# Patient Record
Sex: MALE | Race: WHITE | Employment: UNEMPLOYED | URBAN - METROPOLITAN AREA
[De-identification: names, ages, dates, MRNs, and addresses within clinical notes are randomized per-mention and may not be internally consistent; named-entity substitution may affect disease eponyms.]

---

## 2019-03-15 ENCOUNTER — THERAPY VISIT (OUTPATIENT)
Dept: PHYSICAL THERAPY | Facility: CLINIC | Age: 79
End: 2019-03-15
Payer: COMMERCIAL

## 2019-03-15 DIAGNOSIS — R29.898 BILATERAL LEG WEAKNESS: Primary | ICD-10-CM

## 2019-03-15 PROCEDURE — 97530 THERAPEUTIC ACTIVITIES: CPT | Mod: GP | Performed by: PHYSICAL THERAPIST

## 2019-03-15 PROCEDURE — G8978 MOBILITY CURRENT STATUS: HCPCS | Mod: GP | Performed by: PHYSICAL THERAPIST

## 2019-03-15 PROCEDURE — 97161 PT EVAL LOW COMPLEX 20 MIN: CPT | Mod: GP | Performed by: PHYSICAL THERAPIST

## 2019-03-15 PROCEDURE — G8979 MOBILITY GOAL STATUS: HCPCS | Mod: GP | Performed by: PHYSICAL THERAPIST

## 2019-03-15 PROCEDURE — 97110 THERAPEUTIC EXERCISES: CPT | Mod: GP | Performed by: PHYSICAL THERAPIST

## 2019-03-15 NOTE — PROGRESS NOTES
[facility] Initial Evaluation  Subjective:                                     General health as reported by patient is good.  Pertinent medical history includes:  High blood pressure, osteoarthritis and other.  Medical allergies: no.  Other surgeries include:  Orthopedic surgery and other.  Current medications:  High blood pressure medication.  Current occupation is retired.                                    Objective:  System    Physical Exam    General     ROS    Assessment/Plan:

## 2019-03-15 NOTE — LETTER
"KYM ADAMS PT  [address]  [address]  [place] [address]  [phone]    2019    Re: Geronimo Jacobo   :   1940  MRN:  [id number]   REFERRING PHYSICIAN:   MD KYM Ram PT  Date of Initial Evaluation:  3/15/2019  Visits:  Rxs Used: 1  Reason for Referral:  Bilateral leg weakness    EVALUATION SUMMARY    [facility] Initial Evaluation  Subjective:  General   Condition requiring PT:  History of previous falls and weakness  Associated condition:  Fall  Onset date of current episode/exacerbation comment:  Patient reports the onset of lower extremity weakness starting in 2019 and patient is unable to explain why. Patient reports the leg weakness has resulted in a fall that occurred in 2019 at 5am. Patients spouse \"Paulina\" heard a \"boom\" in the house and found Gabirele on the floor between a wall and a chair. Patients spouse helped him transfer back into standing and they did not see an MD until 2 days later.   Site of Pain: no pain reported.  Quality: none.  Pain Scale: none.  Associated symptoms:  Loss of strength  Pain is:  Worse during the day  Symptoms exacerbated by:  Activity  Symptoms relieved by:  Rest  Progression since onset:  Unchanged  Special testing: none.  General health as reported by patient:  Good  Red flags:  None as reported by the patient    Objective:  Gait:  Knee extension decreased at heel strike  Gait Type:  Antalgic   Assistive Devices:  Walker  Deviations:  Lumbar:  Trunk flexionGeneral Deviations:  Stride length decr and stance time decr    Ankle/Foot Evaluation  ROM:  Arom ankle eval: WFL bilaterally.  Strength wnl ankle: grossly 4+/5 bilaterally.      Re: Geronimo Jacobo   :   1940       Hip Evaluation  Hip Strength:  : grossly 4-/5. : grossly 4-/5.       Knee Evaluation:  ROM:  Strength wnl knee: grossly 4+/5 bilaterally.  AROM  Hyperextension:  Left:  10 deg    Right: 2 deg  Flexion: Left: 120 deg  "   Right: 120 deg    General Evaluation:  Balance:    Single Leg Stance--Eyes Open:  Left: 0/30 sec    Right: 0/30 sec  Sit to Stand Test: 10/reps    Assessment/Plan:    Patient is a 78 year old male with weakness complaints.    Patient has the following significant findings with corresponding treatment plan.                Diagnosis 1:  Bilateral lower extremity weakness  Pain -  hot/cold therapy, self management, education and home program  Decreased strength - therapeutic exercise, therapeutic activities and home program  Impaired gait - gait training, assistive devices and home program  Impaired muscle performance - neuro re-education and home program  Decreased function - therapeutic activities and home program  Impaired posture - neuro re-education, therapeutic activities and home program    Therapy Evaluation Codes:   1) History comprised of:   Personal factors that impact the plan of care:      None.    Comorbidity factors that impact the plan of care are:      None.     Medications impacting care: None.  2) Examination of Body Systems comprised of:   Body structures and functions that impact the plan of care:      Ankle, Hip, Knee and Lumbar spine.   Activity limitations that impact the plan of care are:      Bathing, Bending, Dressing, Lifting, Squatting/kneeling, Stairs, Standing and Walking.  3) Clinical presentation characteristics are:   Stable/Uncomplicated.  4) Decision-Making    Low complexity using standardized patient assessment instrument and/or measureable assessment of functional outcome.  Cumulative Therapy Evaluation is: Low complexity.    Previous and current functional limitations:  (See Goal Flow Sheet for this information)    Short term and Long term goals: (See Goal Flow Sheet for this information)       Re: Geronimo Jacobo   :   1940    Communication ability:  Patient appears to be able to clearly communicate and understand verbal and written communication and follow directions  correctly.  Treatment Explanation - The following has been discussed with the patient:   RX ordered/plan of care  Anticipated outcomes  Possible risks and side effects  This patient would benefit from PT intervention to resume normal activities.   Rehab potential is good.    Frequency:  1 X week, once daily  Duration:  for 6 weeks  Discharge Plan:  Achieve all LTG.  Independent in home treatment program.  Reach maximal therapeutic benefit.    Please refer to the daily flowsheet for treatment today, total treatment time and time spent performing 1:1 timed codes.       Thank you for your referral.    INQUIRIES  Therapist: NELIDA Brennan PT  [address]  [address]  [place] [address]  Phone: [phone]  Fax: [phone]

## 2019-03-15 NOTE — PROGRESS NOTES
"[facility] Initial Evaluation  Subjective:    General   Condition requiring PT:  History of previous falls and weakness  Associated condition:  Fall  Onset date of current episode/exacerbation comment:  Patient reports the onset of lower extremity weakness starting in March 2019 and patient is unable to explain why. Patient reports the leg weakness has resulted in a fall that occurred in March 2019 at 5am. Patients spouse \"Paulina\" heard a \"boom\" in the house and found Gabriele on the floor between a wall and a chair. Patients spouse helped him transfer back into standing and they did not see an MD until 2 days later.   Site of Pain: no pain reported.  Quality: none.  Pain Scale: none.  Associated symptoms:  Loss of strength  Pain is:  Worse during the day  Symptoms exacerbated by:  Activity  Symptoms relieved by:  Rest  Progression since onset:  Unchanged  Special testing: none.  General health as reported by patient:  Good  Red flags:  None as reported by the patient                      Objective:    Gait:  Knee extension decreased at heel strike  Gait Type:  Antalgic   Assistive Devices:  Walker  Deviations:  Lumbar:  Trunk flexionGeneral Deviations:  Stride length decr and stance time decr          Ankle/Foot Evaluation  ROM:  Arom ankle eval: WFL bilaterally.      Strength wnl ankle: grossly 4+/5 bilaterally.                                                   Hip Evaluation    Hip Strength:  : grossly 4-/5. : grossly 4-/5.                                   Knee Evaluation:  ROM:  Strength wnl knee: grossly 4+/5 bilaterally.  AROM    Hyperextension:  Left:  10 deg    Right: 2 deg    Flexion: Left: 120 deg    Right: 120 deg                              General Evaluation:                      Balance:    Single Leg Stance--Eyes Open:  Left: 0/30 sec    Right: 0/30 sec      Sit to Stand Test: 10/reps                                               ROS    Assessment/Plan:    Patient is a 78 year old " male with weakness complaints.    Patient has the following significant findings with corresponding treatment plan.                Diagnosis 1:  Bilateral lower extremity weakness  Pain -  hot/cold therapy, self management, education and home program  Decreased strength - therapeutic exercise, therapeutic activities and home program  Impaired gait - gait training, assistive devices and home program  Impaired muscle performance - neuro re-education and home program  Decreased function - therapeutic activities and home program  Impaired posture - neuro re-education, therapeutic activities and home program    Therapy Evaluation Codes:   1) History comprised of:   Personal factors that impact the plan of care:      None.    Comorbidity factors that impact the plan of care are:      None.     Medications impacting care: None.  2) Examination of Body Systems comprised of:   Body structures and functions that impact the plan of care:      Ankle, Hip, Knee and Lumbar spine.   Activity limitations that impact the plan of care are:      Bathing, Bending, Dressing, Lifting, Squatting/kneeling, Stairs, Standing and Walking.  3) Clinical presentation characteristics are:   Stable/Uncomplicated.  4) Decision-Making    Low complexity using standardized patient assessment instrument and/or measureable assessment of functional outcome.  Cumulative Therapy Evaluation is: Low complexity.    Previous and current functional limitations:  (See Goal Flow Sheet for this information)    Short term and Long term goals: (See Goal Flow Sheet for this information)     Communication ability:  Patient appears to be able to clearly communicate and understand verbal and written communication and follow directions correctly.  Treatment Explanation - The following has been discussed with the patient:   RX ordered/plan of care  Anticipated outcomes  Possible risks and side effects  This patient would benefit from PT intervention to resume normal  activities.   Rehab potential is good.    Frequency:  1 X week, once daily  Duration:  for 6 weeks  Discharge Plan:  Achieve all LTG.  Independent in home treatment program.  Reach maximal therapeutic benefit.    Please refer to the daily flowsheet for treatment today, total treatment time and time spent performing 1:1 timed codes.

## 2019-03-22 ENCOUNTER — THERAPY VISIT (OUTPATIENT)
Dept: PHYSICAL THERAPY | Facility: CLINIC | Age: 79
End: 2019-03-22
Payer: COMMERCIAL

## 2019-03-22 DIAGNOSIS — R29.898 BILATERAL LEG WEAKNESS: Primary | ICD-10-CM

## 2019-03-22 PROCEDURE — 97110 THERAPEUTIC EXERCISES: CPT | Mod: GP | Performed by: PHYSICAL THERAPIST

## 2019-03-22 PROCEDURE — 97530 THERAPEUTIC ACTIVITIES: CPT | Mod: GP | Performed by: PHYSICAL THERAPIST

## 2019-03-29 ENCOUNTER — THERAPY VISIT (OUTPATIENT)
Dept: PHYSICAL THERAPY | Facility: CLINIC | Age: 79
End: 2019-03-29
Payer: COMMERCIAL

## 2019-03-29 DIAGNOSIS — R29.898 BILATERAL LEG WEAKNESS: Primary | ICD-10-CM

## 2019-03-29 PROCEDURE — 97530 THERAPEUTIC ACTIVITIES: CPT | Mod: GP | Performed by: PHYSICAL THERAPIST

## 2019-03-29 PROCEDURE — 97110 THERAPEUTIC EXERCISES: CPT | Mod: GP | Performed by: PHYSICAL THERAPIST

## 2019-04-09 ENCOUNTER — THERAPY VISIT (OUTPATIENT)
Dept: PHYSICAL THERAPY | Facility: CLINIC | Age: 79
End: 2019-04-09
Payer: COMMERCIAL

## 2019-04-09 DIAGNOSIS — R29.898 BILATERAL LEG WEAKNESS: ICD-10-CM

## 2019-04-09 PROCEDURE — 97110 THERAPEUTIC EXERCISES: CPT | Mod: GP | Performed by: PHYSICAL THERAPIST

## 2019-04-09 PROCEDURE — 97530 THERAPEUTIC ACTIVITIES: CPT | Mod: GP | Performed by: PHYSICAL THERAPIST

## 2019-04-23 ENCOUNTER — THERAPY VISIT (OUTPATIENT)
Dept: PHYSICAL THERAPY | Facility: CLINIC | Age: 79
End: 2019-04-23
Payer: COMMERCIAL

## 2019-04-23 DIAGNOSIS — R29.898 BILATERAL LEG WEAKNESS: ICD-10-CM

## 2019-04-23 PROCEDURE — 97110 THERAPEUTIC EXERCISES: CPT | Mod: GP | Performed by: PHYSICAL THERAPIST

## 2019-04-23 PROCEDURE — 97530 THERAPEUTIC ACTIVITIES: CPT | Mod: GP | Performed by: PHYSICAL THERAPIST

## 2019-04-23 NOTE — PROGRESS NOTES
Subjective:  HPI                    Objective:  System    Physical Exam    General     ROS    Assessment/Plan:    DISCHARGE REPORT    SUBJECTIVE  Subjective changes noted by patient:   Subjective: Patient reports using his cane most of the time now instead of the walker. Patient is going to the gym 4-5 days per week and works out for 60 minutes. Patient reports strength training and doing cardiovascular exercise  each time. Maryellen is increasing intensity and duration of his exercises. Patient reports one near fall that occured last week when planting his foot and pivoting. His wife saw he was off balance and assited him back up.    Current pain level is  Current Pain level: 0/10.     Previous pain level was   Initial Pain level: 0/10.   Changes in function:  Yes (See Goal flowsheet attached for changes in current functional level)  Adverse reaction to treatment or activity: None    OBJECTIVE  Changes noted in objective findings:  Yes,   Objective: Good activity tolerance. Decreaed heel strike at initial contact, this improved with cues.  Lower extremity strength grossly 4+/5 bilaterally     ASSESSMENT/PLAN  Updated problem list and treatment plan: Diagnosis 1:   Bilateral leg weakness    STG/LTGs have been met or progress has been made towards goals:  Yes (See Goal flow sheet completed today.)  Assessment of Progress: The patient's condition is improving.  The patient has met all of their long term goals.  Self Management Plans:  Patient is independent in a home treatment program.  Patient is independent in self management of symptoms.  I have re-evaluated this patient and find that the nature, scope, duration and intensity of the therapy is appropriate for the medical condition of the patient.  Geronimo continues to require the following intervention to meet STG and LTG's:  PT intervention is no longer required to meet STG/LTG.    Recommendations:  This patient is ready to be discharged from therapy and continue  their home treatment program.    Please refer to the daily flowsheet for treatment today, total treatment time and time spent performing 1:1 timed codes.

## 2019-04-23 NOTE — LETTER
KYM ADAMS PT  [address]  [address]  [place] [address]  [phone]    2019    Re: Geronimo Jacobo   :   1940  MRN:  [id number]   REFERRING PHYSICIAN:   MD KYM Ram PT  Date of Initial Evaluation:  3/15/2019  Visits:  Rxs Used: 5  Reason for Referral:  Bilateral leg weakness    EVALUATION SUMMARY    DISCHARGE REPORT  SUBJECTIVE  Subjective changes noted by patient:   Subjective: Patient reports using his cane most of the time now instead of the walker. Patient is going to the gym 4-5 days per week and works out for 60 minutes. Patient reports strength training and doing cardiovascular exercise  each time. Patietn is increasing intensity and duration of his exercises. Patient reports one near fall that occured last week when planting his foot and pivoting. His wife saw he was off balance and assited him back up.    Current pain level is  Current Pain level: 0/10.     Previous pain level was   Initial Pain level: 0/10.   Changes in function:  Yes (See Goal flowsheet attached for changes in current functional level)  Adverse reaction to treatment or activity: None    OBJECTIVE  Changes noted in objective findings:  Yes,   Objective: Good activity tolerance. Decreaed heel strike at initial contact, this improved with cues.  Lower extremity strength grossly 4+/5 bilaterally     ASSESSMENT/PLAN  Updated problem list and treatment plan: Diagnosis 1:   Bilateral leg weakness    STG/LTGs have been met or progress has been made towards goals:  Yes (See Goal flow sheet completed today.)  Assessment of Progress: The patient's condition is improving.  The patient has met all of their long term goals.  Self Management Plans:  Patient is independent in a home treatment program.  Patient is independent in self management of symptoms.  I have re-evaluated this patient and find that the nature, scope, duration and intensity of the therapy is appropriate for the medical  condition of the patient.  Geronimo continues to require the following intervention to meet STG and LTG's:  PT intervention is no longer required to meet STG/LTG.        Re: Geronimo Jacobo   :   1940    Recommendations:  This patient is ready to be discharged from therapy and continue their home treatment program.    Please refer to the daily flowsheet for treatment today, total treatment time and time spent performing 1:1 timed codes.      Thank you for your referral.    INQUIRIES  Therapist: NELIDA Brennan PT  [address]  [place] [address]  Phone: [phone]  Fax: [phone]      157.48

## 2019-05-30 PROBLEM — R29.898 BILATERAL LEG WEAKNESS: Status: RESOLVED | Noted: 2019-04-09 | Resolved: 2019-05-30
